# Patient Record
Sex: MALE | Race: WHITE | NOT HISPANIC OR LATINO | URBAN - METROPOLITAN AREA
[De-identification: names, ages, dates, MRNs, and addresses within clinical notes are randomized per-mention and may not be internally consistent; named-entity substitution may affect disease eponyms.]

---

## 2023-08-28 ENCOUNTER — HOSPITAL ENCOUNTER (OUTPATIENT)
Dept: DATA CONVERSION | Facility: HOSPITAL | Age: 58
Discharge: HOME | End: 2023-08-28
Payer: COMMERCIAL

## 2023-08-28 DIAGNOSIS — S63.502A UNSPECIFIED SPRAIN OF LEFT WRIST, INITIAL ENCOUNTER: ICD-10-CM

## 2024-06-14 ENCOUNTER — LAB (OUTPATIENT)
Dept: LAB | Facility: LAB | Age: 59
End: 2024-06-14
Payer: COMMERCIAL

## 2024-06-14 ENCOUNTER — APPOINTMENT (OUTPATIENT)
Dept: PRIMARY CARE | Facility: CLINIC | Age: 59
End: 2024-06-14
Payer: COMMERCIAL

## 2024-06-14 VITALS
HEART RATE: 68 BPM | WEIGHT: 217.2 LBS | HEIGHT: 70 IN | SYSTOLIC BLOOD PRESSURE: 142 MMHG | BODY MASS INDEX: 31.09 KG/M2 | DIASTOLIC BLOOD PRESSURE: 82 MMHG | OXYGEN SATURATION: 96 %

## 2024-06-14 DIAGNOSIS — K90.49 FOOD INTOLERANCE: Primary | ICD-10-CM

## 2024-06-14 DIAGNOSIS — Z00.00 WELL ADULT EXAM: ICD-10-CM

## 2024-06-14 DIAGNOSIS — Z12.11 COLON CANCER SCREENING: ICD-10-CM

## 2024-06-14 DIAGNOSIS — D17.9 LIPOMA, UNSPECIFIED SITE: ICD-10-CM

## 2024-06-14 DIAGNOSIS — Z12.5 SCREENING PSA (PROSTATE SPECIFIC ANTIGEN): ICD-10-CM

## 2024-06-14 DIAGNOSIS — Z13.220 LIPID SCREENING: ICD-10-CM

## 2024-06-14 ASSESSMENT — PATIENT HEALTH QUESTIONNAIRE - PHQ9
1. LITTLE INTEREST OR PLEASURE IN DOING THINGS: NOT AT ALL
SUM OF ALL RESPONSES TO PHQ9 QUESTIONS 1 AND 2: 0
2. FEELING DOWN, DEPRESSED OR HOPELESS: NOT AT ALL

## 2024-06-14 ASSESSMENT — PAIN SCALES - GENERAL: PAINLEVEL: 0-NO PAIN

## 2024-06-14 NOTE — PROGRESS NOTES
Subjective   Patient ID: Leonel Khan is a 58 y.o. male who presents for Annual Exam (Tdap 5/30/2018/EKG- has never had test/Colonoscopy-  never had test-  discuss doing Cologuard/).    HPI NP  Here for CPE.  Patient has multiple complaints but no chronic medical conditions and takes no medical medication.  Patient reports a long history intolerances to foods.  He takes he has oral swelling when he ingests dairy products tree nuts shellfish soy and sesame.  He gets nasal congestion and swelling of the lips.  He has not had allergies in the past 2 no significant avail.  Patient's past history is significant for white foam.  Patient has multiple somatic complaints.  He is worried about dry skin on his feet, occasional numbness to the left forearm after trauma.  Patient had a tetanus shot in 2018.  He has not had coronavirus immunizations or influenza immunizations.  He does not desire them at this time.    Patient has not had colon cancer screening.  Patient does not use tobacco.  Patient does not use alcohol.  Review of Systems  Constitutional Symptoms:  He is negative for fever, loss of appetite, headaches, fatigue.   Eyes:  He is negative for loss and blurring of vision, double vision.   Ear, Nose, Mouth, Throat:  He is negative for hearing loss, tinnitus, nasal congestion, rhinorrhea, nose bleeds, teeth problems, mouth sores, gum disease, dysphagia, sore throat.   Cardiovascular:  He is negative for chest pain/pressure, palpitations, edema, claudication.   Respiratory:  He is negative for shortness of breath, dyspnea on exertion, pain with breathing, coughing.   Breast:  He is negative for tenderness, masses, gynecomastia.   Gastrointestinal:  He is negative for anorexia, indigestion, nausea, vomiting, abdominal pain, change in bowel habits, diarrhea, constipation, hematochaezia, melena, blood in stool.   Musculoskeletal:  He is negative for joint pain, joint swelling, myalgias, cramps.   Integumentary:  He is  "negative for change in mole, skin trouble or rash.   Neurological:  He is negative for headache, numbness, tingling, weakness, tremors.   Psychiatric:  He is negative for depression, anxiety.   Endocrine:  He is negative for weight gain, heat or cold intolerance, polyuria, polydipsia, polyphagia.   Hematologic/Lymphatic:  He is negative for bruising, abnormal bleeding, swollen glands.    Objective   /82 (BP Location: Left arm, Patient Position: Sitting, BP Cuff Size: Adult)   Pulse 68   Ht 1.778 m (5' 10\")   Wt 98.5 kg (217 lb 3.2 oz)   SpO2 96%   BMI 31.16 kg/m²     Physical Exam  General: Vitals reviewed , Pt sits comfortably on the exam room table .  He is a pleasant healthy-appearing  male.  Awake alert oriented.   HEENT : Head is normocephalic atraumatic .  Well groomed short cropped hair with male pattern alopecia noted .   Ears : Normal externally , a lot of hair in the canals, TMs pearly gray. No cerumen buildup.   Eyes: Bespectacled , conjunctiva and sclera clear , pupils equal and reactive , EOMI without nystagmus .   Nose:   No deviated septum  Oropharynx :    Moist oral mucosa no cervical lymphadenopathy.  Neck: Normal to inspection , soft , slightly increased adiposity , no palpable masses lymphadenopathy thyroid nodules or enlargement .   Chest: Normal to inspection .   Pulmonary: Clear vesicular breath sounds posteriorly without wheezing rales or rhonchi .   Cardiovascular : Regular rate rhythm , normal S1-S2, no murmurs rubs or gallops .   Abdomen: Flat , soft , no tenderness guarding rigidity or palpable organomegaly .   Genitourinary /anal rectal: Visual exam to the anus is unremarkable; there is a small tag at is consistent with the prior hemorrhoid however  Musculoskeletal : Joints without any real gross arthritic changes , good strength and range of motion in the upper and lower extremities .   Neurologic: Intact nonfocal, cranial nerves 2-12 grossly intact .   Integumentary: " Multiple lipomas to the arms.  Good turgor , scattered nevi throughout the thorax , none notably atypical , no visible bruising rashes or eruptions , diffuse here throughout the thorax. The fingernails are in good repair without visible abnormality .   Psych : Mood and affect are pleasant and appropriate.  Assessment/Plan   Problem List Items Addressed This Visit    None  Visit Diagnoses         Codes    Food intolerance    -  Primary   stable.  Patient is very well versed in with food intolerances that he has and he avoids when he needs to. K90.49    Well adult exam    normal exam. Z00.00    Relevant Orders    ECG 12 Lead (Completed)    CBC and Auto Differential    Comprehensive Metabolic Panel    Lipid Panel    Urinalysis with Reflex Microscopic    Lipid screening    needs workup.  Will get a lipid panel in the near future. Z13.220    Relevant Orders    Lipid Panel    Screening PSA (prostate specific antigen)    needs workup.  Get a PSA in the near future. Z12.5    Relevant Orders    Prostate Specific Antigen, Screen    Colon cancer screening    needs workup.  Patient agreed to Cologuard. Z12.11    Relevant Orders    Cologuard® colon cancer screening    Lipoma, unspecified site    stable.  Probable lipoma to be arms. D17.9

## 2024-06-15 ENCOUNTER — LAB (OUTPATIENT)
Dept: LAB | Facility: LAB | Age: 59
End: 2024-06-15
Payer: COMMERCIAL

## 2024-06-15 DIAGNOSIS — Z12.5 SCREENING PSA (PROSTATE SPECIFIC ANTIGEN): ICD-10-CM

## 2024-06-15 DIAGNOSIS — Z00.00 WELL ADULT EXAM: ICD-10-CM

## 2024-06-15 DIAGNOSIS — Z13.220 LIPID SCREENING: ICD-10-CM

## 2024-06-15 LAB
ALBUMIN SERPL-MCNC: 4.1 G/DL (ref 3.5–5)
ALP BLD-CCNC: 60 U/L (ref 35–125)
ALT SERPL-CCNC: 18 U/L (ref 5–40)
ANION GAP SERPL CALC-SCNC: 11 MMOL/L
APPEARANCE UR: CLEAR
AST SERPL-CCNC: 23 U/L (ref 5–40)
BASOPHILS # BLD AUTO: 0.04 X10*3/UL (ref 0–0.1)
BASOPHILS NFR BLD AUTO: 0.6 %
BILIRUB SERPL-MCNC: 0.7 MG/DL (ref 0.1–1.2)
BILIRUB UR STRIP.AUTO-MCNC: NEGATIVE MG/DL
BUN SERPL-MCNC: 19 MG/DL (ref 8–25)
CALCIUM SERPL-MCNC: 9.1 MG/DL (ref 8.5–10.4)
CHLORIDE SERPL-SCNC: 104 MMOL/L (ref 97–107)
CHOLEST SERPL-MCNC: 201 MG/DL (ref 133–200)
CHOLEST/HDLC SERPL: 3 {RATIO}
CO2 SERPL-SCNC: 25 MMOL/L (ref 24–31)
COLOR UR: YELLOW
CREAT SERPL-MCNC: 1.1 MG/DL (ref 0.4–1.6)
EGFRCR SERPLBLD CKD-EPI 2021: 78 ML/MIN/1.73M*2
EOSINOPHIL # BLD AUTO: 0.4 X10*3/UL (ref 0–0.7)
EOSINOPHIL NFR BLD AUTO: 5.6 %
ERYTHROCYTE [DISTWIDTH] IN BLOOD BY AUTOMATED COUNT: 12.7 % (ref 11.5–14.5)
GLUCOSE SERPL-MCNC: 98 MG/DL (ref 65–99)
GLUCOSE UR STRIP.AUTO-MCNC: NORMAL MG/DL
HCT VFR BLD AUTO: 44.8 % (ref 41–52)
HDLC SERPL-MCNC: 66 MG/DL
HGB BLD-MCNC: 14.5 G/DL (ref 13.5–17.5)
HYALINE CASTS #/AREA URNS AUTO: ABNORMAL /LPF
IMM GRANULOCYTES # BLD AUTO: 0.01 X10*3/UL (ref 0–0.7)
IMM GRANULOCYTES NFR BLD AUTO: 0.1 % (ref 0–0.9)
KETONES UR STRIP.AUTO-MCNC: NEGATIVE MG/DL
LDLC SERPL CALC-MCNC: 118 MG/DL (ref 65–130)
LEUKOCYTE ESTERASE UR QL STRIP.AUTO: NEGATIVE
LYMPHOCYTES # BLD AUTO: 2.33 X10*3/UL (ref 1.2–4.8)
LYMPHOCYTES NFR BLD AUTO: 32.8 %
MCH RBC QN AUTO: 29 PG (ref 26–34)
MCHC RBC AUTO-ENTMCNC: 32.4 G/DL (ref 32–36)
MCV RBC AUTO: 90 FL (ref 80–100)
MONOCYTES # BLD AUTO: 0.56 X10*3/UL (ref 0.1–1)
MONOCYTES NFR BLD AUTO: 7.9 %
MUCOUS THREADS #/AREA URNS AUTO: ABNORMAL /LPF
NEUTROPHILS # BLD AUTO: 3.76 X10*3/UL (ref 1.2–7.7)
NEUTROPHILS NFR BLD AUTO: 53 %
NITRITE UR QL STRIP.AUTO: NEGATIVE
NRBC BLD-RTO: 0 /100 WBCS (ref 0–0)
PH UR STRIP.AUTO: 5.5 [PH]
PLATELET # BLD AUTO: 229 X10*3/UL (ref 150–450)
POTASSIUM SERPL-SCNC: 4.9 MMOL/L (ref 3.4–5.1)
PROT SERPL-MCNC: 6.8 G/DL (ref 5.9–7.9)
PROT UR STRIP.AUTO-MCNC: NORMAL MG/DL
PSA SERPL-MCNC: 0.6 NG/ML
RBC # BLD AUTO: 5 X10*6/UL (ref 4.5–5.9)
RBC # UR STRIP.AUTO: NEGATIVE /UL
RBC #/AREA URNS AUTO: ABNORMAL /HPF
SODIUM SERPL-SCNC: 140 MMOL/L (ref 133–145)
SP GR UR STRIP.AUTO: 1.03
TRIGL SERPL-MCNC: 85 MG/DL (ref 40–150)
UROBILINOGEN UR STRIP.AUTO-MCNC: NORMAL MG/DL
WBC # BLD AUTO: 7.1 X10*3/UL (ref 4.4–11.3)
WBC #/AREA URNS AUTO: ABNORMAL /HPF

## 2024-06-15 PROCEDURE — 84153 ASSAY OF PSA TOTAL: CPT

## 2024-06-15 PROCEDURE — 80053 COMPREHEN METABOLIC PANEL: CPT

## 2024-06-15 PROCEDURE — 36415 COLL VENOUS BLD VENIPUNCTURE: CPT

## 2024-06-15 PROCEDURE — 85025 COMPLETE CBC W/AUTO DIFF WBC: CPT

## 2024-06-15 PROCEDURE — 80061 LIPID PANEL: CPT

## 2024-06-15 PROCEDURE — 81001 URINALYSIS AUTO W/SCOPE: CPT

## 2025-06-14 LAB — NONINV COLON CA DNA+OCC BLD SCRN STL QL: NEGATIVE
